# Patient Record
Sex: FEMALE | Race: BLACK OR AFRICAN AMERICAN | NOT HISPANIC OR LATINO | URBAN - METROPOLITAN AREA
[De-identification: names, ages, dates, MRNs, and addresses within clinical notes are randomized per-mention and may not be internally consistent; named-entity substitution may affect disease eponyms.]

---

## 2017-08-31 ENCOUNTER — EMERGENCY (EMERGENCY)
Facility: HOSPITAL | Age: 62
LOS: 1 days | Discharge: HOME | End: 2017-08-31
Attending: EMERGENCY MEDICINE | Admitting: EMERGENCY MEDICINE
Payer: COMMERCIAL

## 2017-08-31 VITALS
TEMPERATURE: 98 F | HEART RATE: 66 BPM | DIASTOLIC BLOOD PRESSURE: 70 MMHG | RESPIRATION RATE: 16 BRPM | SYSTOLIC BLOOD PRESSURE: 136 MMHG | OXYGEN SATURATION: 100 %

## 2017-08-31 VITALS
RESPIRATION RATE: 16 BRPM | OXYGEN SATURATION: 97 % | TEMPERATURE: 98 F | DIASTOLIC BLOOD PRESSURE: 86 MMHG | SYSTOLIC BLOOD PRESSURE: 148 MMHG | HEART RATE: 74 BPM

## 2017-08-31 LAB
ALBUMIN SERPL ELPH-MCNC: 4.1 G/DL — SIGNIFICANT CHANGE UP (ref 3.3–5)
ALP SERPL-CCNC: 77 U/L — SIGNIFICANT CHANGE UP (ref 40–120)
ALT FLD-CCNC: 16 U/L — SIGNIFICANT CHANGE UP (ref 4–33)
AST SERPL-CCNC: 17 U/L — SIGNIFICANT CHANGE UP (ref 4–32)
BASOPHILS # BLD AUTO: 0.01 K/UL — SIGNIFICANT CHANGE UP (ref 0–0.2)
BASOPHILS NFR BLD AUTO: 0.2 % — SIGNIFICANT CHANGE UP (ref 0–2)
BILIRUB SERPL-MCNC: 0.5 MG/DL — SIGNIFICANT CHANGE UP (ref 0.2–1.2)
BUN SERPL-MCNC: 24 MG/DL — HIGH (ref 7–23)
CALCIUM SERPL-MCNC: 9.2 MG/DL — SIGNIFICANT CHANGE UP (ref 8.4–10.5)
CHLORIDE SERPL-SCNC: 101 MMOL/L — SIGNIFICANT CHANGE UP (ref 98–107)
CO2 SERPL-SCNC: 24 MMOL/L — SIGNIFICANT CHANGE UP (ref 22–31)
CREAT SERPL-MCNC: 1.1 MG/DL — SIGNIFICANT CHANGE UP (ref 0.5–1.3)
CRP SERPL-MCNC: 7.9 MG/L — SIGNIFICANT CHANGE UP
EOSINOPHIL # BLD AUTO: 0.1 K/UL — SIGNIFICANT CHANGE UP (ref 0–0.5)
EOSINOPHIL NFR BLD AUTO: 2 % — SIGNIFICANT CHANGE UP (ref 0–6)
ERYTHROCYTE [SEDIMENTATION RATE] IN BLOOD: 14 MM/HR — SIGNIFICANT CHANGE UP (ref 4–25)
GLUCOSE SERPL-MCNC: 100 MG/DL — HIGH (ref 70–99)
HCT VFR BLD CALC: 40.5 % — SIGNIFICANT CHANGE UP (ref 34.5–45)
HGB BLD-MCNC: 13.6 G/DL — SIGNIFICANT CHANGE UP (ref 11.5–15.5)
IMM GRANULOCYTES # BLD AUTO: 0.01 # — SIGNIFICANT CHANGE UP
IMM GRANULOCYTES NFR BLD AUTO: 0.2 % — SIGNIFICANT CHANGE UP (ref 0–1.5)
LYMPHOCYTES # BLD AUTO: 1.21 K/UL — SIGNIFICANT CHANGE UP (ref 1–3.3)
LYMPHOCYTES # BLD AUTO: 24 % — SIGNIFICANT CHANGE UP (ref 13–44)
MCHC RBC-ENTMCNC: 27.3 PG — SIGNIFICANT CHANGE UP (ref 27–34)
MCHC RBC-ENTMCNC: 33.6 % — SIGNIFICANT CHANGE UP (ref 32–36)
MCV RBC AUTO: 81.3 FL — SIGNIFICANT CHANGE UP (ref 80–100)
MONOCYTES # BLD AUTO: 0.35 K/UL — SIGNIFICANT CHANGE UP (ref 0–0.9)
MONOCYTES NFR BLD AUTO: 6.9 % — SIGNIFICANT CHANGE UP (ref 2–14)
NEUTROPHILS # BLD AUTO: 3.36 K/UL — SIGNIFICANT CHANGE UP (ref 1.8–7.4)
NEUTROPHILS NFR BLD AUTO: 66.7 % — SIGNIFICANT CHANGE UP (ref 43–77)
NRBC # FLD: 0 — SIGNIFICANT CHANGE UP
PLATELET # BLD AUTO: 182 K/UL — SIGNIFICANT CHANGE UP (ref 150–400)
PMV BLD: 11.2 FL — SIGNIFICANT CHANGE UP (ref 7–13)
POTASSIUM SERPL-MCNC: 4.3 MMOL/L — SIGNIFICANT CHANGE UP (ref 3.5–5.3)
POTASSIUM SERPL-SCNC: 4.3 MMOL/L — SIGNIFICANT CHANGE UP (ref 3.5–5.3)
PROT SERPL-MCNC: 7.3 G/DL — SIGNIFICANT CHANGE UP (ref 6–8.3)
RBC # BLD: 4.98 M/UL — SIGNIFICANT CHANGE UP (ref 3.8–5.2)
RBC # FLD: 14.4 % — SIGNIFICANT CHANGE UP (ref 10.3–14.5)
SODIUM SERPL-SCNC: 138 MMOL/L — SIGNIFICANT CHANGE UP (ref 135–145)
WBC # BLD: 5.04 K/UL — SIGNIFICANT CHANGE UP (ref 3.8–10.5)
WBC # FLD AUTO: 5.04 K/UL — SIGNIFICANT CHANGE UP (ref 3.8–10.5)

## 2017-08-31 PROCEDURE — 99284 EMERGENCY DEPT VISIT MOD MDM: CPT

## 2017-08-31 PROCEDURE — 73630 X-RAY EXAM OF FOOT: CPT | Mod: 26,RT

## 2017-08-31 RX ADMIN — Medication 1 TABLET(S): at 09:20

## 2017-08-31 NOTE — ED ADULT NURSE NOTE - OBJECTIVE STATEMENT
pt rec'd in intake, s/p being scratched by her employer's cat, pt noted with 2 scratches to right foot with peripheral swelling, c/o pain.

## 2017-08-31 NOTE — ED PROVIDER NOTE - PLAN OF CARE
Follow up with your Doctor in 1-2 days.  Take Augmentin one tablet orally 2 x a day x 7 days.  Rest.  Elevate foot.  Return to the ER for any persistent/worsening or new symptoms fevers, chills, redness, swelling or any concerning symptoms.

## 2017-08-31 NOTE — ED PROVIDER NOTE - OBJECTIVE STATEMENT
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 Pt now notes swelling, redness and pain. Pt called employer and I spoke to employer. Cat is vaccinated, has scratched employer's father and he has been okay. Cat has been acting normally. Denies fever, chills, difficulty walking or any other complaints.

## 2017-08-31 NOTE — ED ADULT NURSE REASSESSMENT NOTE - NS ED NURSE REASSESS COMMENT FT1
assumed care of pt from Intake pt ambulating without difficulty warm packs to right foot awaiting labs Plan of care discussed with pt vital signs stable

## 2017-08-31 NOTE — ED PROVIDER NOTE - SKIN WOUND DESCRIPTION
right foot dorsal swelling, 3x3cm area w/ 2 scratches noted and redness, no fluctuance, no streaking, FROM

## 2017-08-31 NOTE — ED PROVIDER NOTE - MEDICAL DECISION MAKING DETAILS
62 y/o F c/o right foot pain s/p scratched by cat. Will obtain XR, basic labs, give Augmentin for coverage from cat scratch and apply hot compresses

## 2017-08-31 NOTE — ED PROVIDER NOTE - CARE PLAN
Principal Discharge DX:	Cat bite of foot, right, initial encounter Principal Discharge DX:	Cat bite of foot, right, initial encounter  Instructions for follow-up, activity and diet:	Follow up with your Doctor in 1-2 days.  Take Augmentin one tablet orally 2 x a day x 7 days.  Rest.  Elevate foot.  Return to the ER for any persistent/worsening or new symptoms fevers, chills, redness, swelling or any concerning symptoms.

## 2017-08-31 NOTE — ED PROVIDER NOTE - PROGRESS NOTE DETAILS
RAQUEL Young:(QUID PA) pt feels better; pt ambulating without assistance.  Results reviewed with patient.  Discharge reviewed and discussed with patient.  Per patient Cat's vaccines are UTD.

## 2019-03-31 ENCOUNTER — EMERGENCY (EMERGENCY)
Facility: HOSPITAL | Age: 64
LOS: 1 days | Discharge: HOME | End: 2019-03-31
Attending: EMERGENCY MEDICINE | Admitting: EMERGENCY MEDICINE
Payer: COMMERCIAL

## 2019-03-31 VITALS
TEMPERATURE: 98 F | OXYGEN SATURATION: 100 % | RESPIRATION RATE: 16 BRPM | DIASTOLIC BLOOD PRESSURE: 96 MMHG | SYSTOLIC BLOOD PRESSURE: 172 MMHG | HEART RATE: 72 BPM

## 2019-03-31 PROCEDURE — 99283 EMERGENCY DEPT VISIT LOW MDM: CPT | Mod: 25

## 2019-03-31 PROCEDURE — 93010 ELECTROCARDIOGRAM REPORT: CPT

## 2019-03-31 NOTE — ED ADULT TRIAGE NOTE - CHIEF COMPLAINT QUOTE
Patient p/w a c/c of intermittent atraumatic pain in her left arm x3 weeks, described as squeezing in nature.  Denies HA, blurry vision, diplopia, dizziness, SOB, CP.  Negative facial droop, pronator drift, slurred speech.  Denies PMH.

## 2019-04-01 LAB
ALBUMIN SERPL ELPH-MCNC: 3.9 G/DL — SIGNIFICANT CHANGE UP (ref 3.3–5)
ALP SERPL-CCNC: 87 U/L — SIGNIFICANT CHANGE UP (ref 40–120)
ALT FLD-CCNC: 21 U/L — SIGNIFICANT CHANGE UP (ref 4–33)
ANION GAP SERPL CALC-SCNC: 12 MMO/L — SIGNIFICANT CHANGE UP (ref 7–14)
AST SERPL-CCNC: 21 U/L — SIGNIFICANT CHANGE UP (ref 4–32)
BASOPHILS # BLD AUTO: 0.01 K/UL — SIGNIFICANT CHANGE UP (ref 0–0.2)
BASOPHILS NFR BLD AUTO: 0.4 % — SIGNIFICANT CHANGE UP (ref 0–2)
BILIRUB SERPL-MCNC: < 0.2 MG/DL — LOW (ref 0.2–1.2)
BUN SERPL-MCNC: 29 MG/DL — HIGH (ref 7–23)
CALCIUM SERPL-MCNC: 8.9 MG/DL — SIGNIFICANT CHANGE UP (ref 8.4–10.5)
CHLORIDE SERPL-SCNC: 106 MMOL/L — SIGNIFICANT CHANGE UP (ref 98–107)
CO2 SERPL-SCNC: 22 MMOL/L — SIGNIFICANT CHANGE UP (ref 22–31)
CREAT SERPL-MCNC: 1.27 MG/DL — SIGNIFICANT CHANGE UP (ref 0.5–1.3)
EOSINOPHIL # BLD AUTO: 0.11 K/UL — SIGNIFICANT CHANGE UP (ref 0–0.5)
EOSINOPHIL NFR BLD AUTO: 4.1 % — SIGNIFICANT CHANGE UP (ref 0–6)
GLUCOSE SERPL-MCNC: 116 MG/DL — HIGH (ref 70–99)
HCT VFR BLD CALC: 39.3 % — SIGNIFICANT CHANGE UP (ref 34.5–45)
HGB BLD-MCNC: 12.4 G/DL — SIGNIFICANT CHANGE UP (ref 11.5–15.5)
IMM GRANULOCYTES NFR BLD AUTO: 0 % — SIGNIFICANT CHANGE UP (ref 0–1.5)
LYMPHOCYTES # BLD AUTO: 0.97 K/UL — LOW (ref 1–3.3)
LYMPHOCYTES # BLD AUTO: 36.5 % — SIGNIFICANT CHANGE UP (ref 13–44)
MAGNESIUM SERPL-MCNC: 1.9 MG/DL — SIGNIFICANT CHANGE UP (ref 1.6–2.6)
MCHC RBC-ENTMCNC: 26.5 PG — LOW (ref 27–34)
MCHC RBC-ENTMCNC: 31.6 % — LOW (ref 32–36)
MCV RBC AUTO: 84 FL — SIGNIFICANT CHANGE UP (ref 80–100)
MONOCYTES # BLD AUTO: 0.29 K/UL — SIGNIFICANT CHANGE UP (ref 0–0.9)
MONOCYTES NFR BLD AUTO: 10.9 % — SIGNIFICANT CHANGE UP (ref 2–14)
NEUTROPHILS # BLD AUTO: 1.28 K/UL — LOW (ref 1.8–7.4)
NEUTROPHILS NFR BLD AUTO: 48.1 % — SIGNIFICANT CHANGE UP (ref 43–77)
NRBC # FLD: 0 K/UL — SIGNIFICANT CHANGE UP (ref 0–0)
PHOSPHATE SERPL-MCNC: 3.6 MG/DL — SIGNIFICANT CHANGE UP (ref 2.5–4.5)
PLATELET # BLD AUTO: 195 K/UL — SIGNIFICANT CHANGE UP (ref 150–400)
PMV BLD: 10.8 FL — SIGNIFICANT CHANGE UP (ref 7–13)
POTASSIUM SERPL-MCNC: 3.9 MMOL/L — SIGNIFICANT CHANGE UP (ref 3.5–5.3)
POTASSIUM SERPL-SCNC: 3.9 MMOL/L — SIGNIFICANT CHANGE UP (ref 3.5–5.3)
PROT SERPL-MCNC: 6.5 G/DL — SIGNIFICANT CHANGE UP (ref 6–8.3)
RBC # BLD: 4.68 M/UL — SIGNIFICANT CHANGE UP (ref 3.8–5.2)
RBC # FLD: 14.6 % — HIGH (ref 10.3–14.5)
SODIUM SERPL-SCNC: 140 MMOL/L — SIGNIFICANT CHANGE UP (ref 135–145)
WBC # BLD: 2.66 K/UL — LOW (ref 3.8–10.5)
WBC # FLD AUTO: 2.66 K/UL — LOW (ref 3.8–10.5)

## 2019-04-01 NOTE — ED PROVIDER NOTE - OBJECTIVE STATEMENT
64 y/o F with no significant PMHx presents to ED with L upper arm muscle spasm since 2 weeks. Pt states that spasms are unprovoked and that they have been causing significant pain. Pt notes that the episodes last minutes at a time. Pt denies any chest pain , SOB , fever , chills , or diarrhea. 64 y/o F with no significant PMHx presents to ED with L upper arm muscle spasm since 2 weeks. Pt states that spasms are unprovoked and that they have been causing significant pain. Pt notes that the episodes last minutes at a time. Pt denies any chest pain , SOB , fever , chills , or diarrhea. No falls or injuries.  No HA.

## 2019-04-01 NOTE — ED PROVIDER NOTE - CLINICAL SUMMARY MEDICAL DECISION MAKING FREE TEXT BOX
64 y/o healthy F presents to ED with frequent muscle spasm. Will assess electrolytes, patient due to see PMD tomorrow morning.

## 2019-04-01 NOTE — ED ADULT NURSE NOTE - OBJECTIVE STATEMENT
pt presents to the ed today for left arm pain that is intermittent feels as if it is pitching. Arm is no evident trauma. Full ROM. As per MD butterfly for labs. Awaiting lab results

## 2019-04-01 NOTE — ED PROVIDER NOTE - NSFOLLOWUPINSTRUCTIONS_ED_ALL_ED_FT
A cause for yuour symptoms was not found. Please see your doctor as scheduled. Return to the ER for worsening pain, chest pain, fevers, or any other concerning signs.

## 2019-05-13 NOTE — ED PROVIDER NOTE - ATTESTATION, MLM
Labs have not been uploaded in system, Heme/onc wondering why pt was preferred.  Lab results can be faxed to [phone]. Will need to fax tomorrow as results are at other office.   I have reviewed and confirmed nurses' notes for patient's medications, allergies, medical history, and surgical history.

## 2020-09-11 NOTE — ED PROVIDER NOTE - CONDUCTED A DETAILED DISCUSSION WITH PATIENT AND/OR GUARDIAN REGARDING, MDM
[Negative] : Heme/Lymph [Patient Intake Form Reviewed] : Patient intake form was reviewed lab results/radiology results

## 2023-06-29 ENCOUNTER — EMERGENCY (EMERGENCY)
Facility: HOSPITAL | Age: 68
LOS: 1 days | Discharge: HOME | End: 2023-06-29
Attending: STUDENT IN AN ORGANIZED HEALTH CARE EDUCATION/TRAINING PROGRAM | Admitting: STUDENT IN AN ORGANIZED HEALTH CARE EDUCATION/TRAINING PROGRAM
Payer: COMMERCIAL

## 2023-06-29 VITALS
HEART RATE: 70 BPM | DIASTOLIC BLOOD PRESSURE: 79 MMHG | OXYGEN SATURATION: 100 % | SYSTOLIC BLOOD PRESSURE: 151 MMHG | RESPIRATION RATE: 15 BRPM | TEMPERATURE: 98 F

## 2023-06-29 DIAGNOSIS — T16.1XXA FOREIGN BODY IN RIGHT EAR, INITIAL ENCOUNTER: ICD-10-CM

## 2023-06-29 DIAGNOSIS — H60.501 UNSPECIFIED ACUTE NONINFECTIVE OTITIS EXTERNA, RIGHT EAR: ICD-10-CM

## 2023-06-29 PROBLEM — Z78.9 OTHER SPECIFIED HEALTH STATUS: Chronic | Status: ACTIVE | Noted: 2019-04-11

## 2023-06-29 PROCEDURE — 99284 EMERGENCY DEPT VISIT MOD MDM: CPT

## 2023-06-29 RX ORDER — CIPROFLOXACIN/HYDROCORTISONE 0.2 %-1 %
2 SUSPENSION, DROPS(FINAL DOSAGE FORM)(ML) OTIC (EAR)
Refills: 0 | Status: DISCONTINUED | OUTPATIENT
Start: 2023-06-29 | End: 2023-07-02

## 2023-06-29 RX ORDER — CIPROFLOXACIN AND DEXAMETHASONE 3; 1 MG/ML; MG/ML
4 SUSPENSION/ DROPS AURICULAR (OTIC)
Qty: 1 | Refills: 0
Start: 2023-06-29 | End: 2023-07-05

## 2023-06-29 RX ORDER — CIPROFLOXACIN AND FLUOCINOLONE ACETONIDE .75; .0625 MG/.25ML; MG/.25ML
0.25 SOLUTION AURICULAR (OTIC) ONCE
Refills: 0 | Status: DISCONTINUED | OUTPATIENT
Start: 2023-06-29 | End: 2023-06-29

## 2023-06-29 RX ADMIN — Medication 2 DROP(S): at 12:12

## 2023-06-29 NOTE — ED PROVIDER NOTE - PHYSICAL EXAMINATION
+R ear - impacted foreign body (Q tip) with bright red blood visualized in ear canal; partial tympanic membrane that was visualized is intact however cannot view TM posterior to FBO

## 2023-06-29 NOTE — ED ADULT NURSE NOTE - HISTORY OF COVID-19 VACCINATION
Addended by: KADEEM MCBRIDE on: 11/30/2018 03:10 PM     Modules accepted: Orders    
Vaccine status unknown

## 2023-06-29 NOTE — ED PROVIDER NOTE - PATIENT PORTAL LINK FT
You can access the FollowMyHealth Patient Portal offered by [facility] by registering at the following website: [email or web address]. By joining [employer]’s FollowMyHealth portal, you will also be able to view your health information using other applications (apps) compatible with our system.

## 2023-06-29 NOTE — CONSULT NOTE ADULT - SUBJECTIVE AND OBJECTIVE BOX
CC: "something inside my right ear"    HPI: 66 y/o F without significant PMHx presents to [facility] ED for right ear pain and foreign body sensation in the ear. Patient reports she was using q-tip to clean her right ear, and part of it broke off. Since then she has been having pain and feeling something is stuck inside her ears and mild decreased of hearing on the right side. Denies otorrhea, tinnitus, vertigo, headache, fever, chills, and nasal discharges.    PAST MEDICAL & SURGICAL HISTORY:  No pertinent past medical history      No significant past surgical history        Allergies    No Known Allergies    Intolerances      MEDICATIONS  (STANDING):  ciprofloxacin/hydrocortisone Suspension Otic 2 Drop(s) Right Ear two times a day    MEDICATIONS  (PRN):      Social History: never smoker    Family history: No pertinent family history in first degree relatives    ROS:   ENT: all negative except as noted in HPI   CV: denies palpitations  Pulm: denies SOB, cough, hemoptysis  GI: denies change in appetite, indigestion, n/v  : denies pertinent urinary symptoms, urgency  Neuro: denies numbness/tingling, loss of sensation  Psych: denies anxiety  MS: denies muscle weakness, instability  Heme: denies easy bruising or bleeding  Endo: denies heat/cold intolerance, excessive sweating  Vascular: denies LE edema    Vital Signs Last 24 Hrs  T(C): 36.6 (29 Jun 2023 07:49), Max: 36.6 (29 Jun 2023 07:49)  T(F): 97.9 (29 Jun 2023 07:49), Max: 97.9 (29 Jun 2023 07:49)  HR: 70 (29 Jun 2023 07:49) (70 - 70)  BP: 151/79 (29 Jun 2023 07:49) (151/79 - 151/79)  BP(mean): --  RR: 15 (29 Jun 2023 07:49) (15 - 15)  SpO2: 100% (29 Jun 2023 07:49) (100% - 100%)    Parameters below as of 29 Jun 2023 07:49  Patient On (Oxygen Delivery Method): room air       PHYSICAL EXAM:  Gen: NAD  Skin: No rashes, bruises, or lesions  Head: Normocephalic, Atraumatic  Face: no edema, erythema, or fluctuance. Parotid glands soft without mass  Eyes: no scleral injection  Ears: Right - ear canal with mild ecchymosis in the inferior EAC, whitish tissue mixed with cerumen noted cover against TM, removed via suction. TM visulized, intact without effusion or erythema. No evidence of any fluid drainage. No mastoid tenderness, erythema, or ear bulging            Left - ear canal clear, TM intact without effusion or erythema. No evidence of any fluid drainage. No mastoid tenderness, erythema, or ear bulging  Nose: Nares bilaterally patent, no discharge  Mouth: No stridor, no drooling, no trismus.  Mucosa moist, tongue/uvula midline, oropharynx clear  Neck: Flat, supple, no lymphadenopathy, trachea midline, no masses  Lymphatic: No lymphadenopathy  Resp: breathing easily, no stridor  CV: no peripheral edema/cyanosis  GI: nondistended   Peripheral vascular: no JVD or edema  Neuro: facial nerve intact, no facial droop

## 2023-06-29 NOTE — ED PROVIDER NOTE - NSFOLLOWUPINSTRUCTIONS_ED_ALL_ED_FT
You were seen for ear pain. The object in the right ear was removed. Please put two drops in the affected ear twice a day for seven days. Return to the ER for increased pain, draining, hearing loss. Follow up with your primary care doctor in 2 days.

## 2023-06-29 NOTE — ED PROVIDER NOTE - OBJECTIVE STATEMENT
68 yo F no pmh presents with R ear pain after inserting a Q tip that broke off in the ear. Has had some bleeding from the ear. Endorses pain. Patient went to urgent care yesterday, was given ear drops. No fevers, chills.

## 2023-06-29 NOTE — ED ADULT TRIAGE NOTE - CHIEF COMPLAINT QUOTE
presents C/o right ear pain x1 day after using Q-tip. No complaints of chest pain, headache, nausea, dizziness, vomiting  SOB, fever, chills verbalized. no phx.

## 2023-06-29 NOTE — ED PROVIDER NOTE - ATTENDING CONTRIBUTION TO CARE
I have personally performed a face to face medical and diagnostic evaluation of the patient. I have discussed with and reviewed the Resident's note and agree with the History, ROS, Physical Exam and MDM unless otherwise indicated. A brief summary of my personal evaluation and impression can be found below.    67y F w/ no PMHx p/w c/f foreign body in R ear and ear pain after sticking q-tip in ear to itch it yesterday morning at 6am. Patient states when she was scratching her ear with q-tip, the middle part broke off and the tip remained in her ear, then took the broken piece of the q-tip (wood) and stuck it in the ear to dislodge tip but then saw blood and felt pain. States she waited until today to come in because she thought it would dislodge on her own. States she has a feeling of "fullness" in her ear and intermittent pain. Went to  this morning and was prescribed polytrim drops and told to come to the ED.     Physical Exam:  Gen: NAD, AOx3, non-toxic appearing, able to ambulate without assistance  Head: NCAT  HEENT: no e/o foreign body in R ear, +large tympanic perforation w/ e/o blood in auditory canal, normal conjunctiva, tongue midline, oral mucosa moist  Lung: CTAB, no respiratory distress, no wheezes/rhonchi/rales B/L, speaking in full sentences  CV: RRR, no murmurs, rubs or gallops  Neuro: No focal sensory or motor deficits  Skin: no rash     ENT consult, no visible foreign body to remove contrary to resident note. Patient defers pain medication at this time. Will reassess. I have personally performed a face to face medical and diagnostic evaluation of the patient. I have discussed with and reviewed the Resident's note and agree with the History, ROS, Physical Exam and MDM unless otherwise indicated. A brief summary of my personal evaluation and impression can be found below.    67y F w/ no PMHx p/w c/f foreign body in R ear and ear pain after sticking q-tip in ear to itch it yesterday morning at 6am. Patient states when she was scratching her ear with q-tip, the middle part broke off and the tip remained in her ear, then took the broken piece of the q-tip (wood) and stuck it in the ear to dislodge tip but then saw blood and felt pain. States she waited until today to come in because she thought it would dislodge on her own. States she has a feeling of "fullness" in her ear and intermittent pain. Went to  this morning and was prescribed polytrim drops and told to come to the ED.     Physical Exam:  Gen: NAD, AOx3, non-toxic appearing, able to ambulate without assistance  Head: NCAT  HEENT: questionable TM perforation vs. just foreign body, +blood in auditory canal, normal conjunctiva, tongue midline, oral mucosa moist  Lung: CTAB, no respiratory distress, no wheezes/rhonchi/rales B/L, speaking in full sentences  CV: RRR, no murmurs, rubs or gallops  Neuro: No focal sensory or motor deficits  Skin: no rash     ENT consult, as difficult to assess for large TM perforation, patient defers pain medication at this time. Will reassess.

## 2023-06-29 NOTE — CONSULT NOTE ADULT - ASSESSMENT
66 y/o F without significant PMHx presents to [facility] ED for right ear pain and foreign body sensation in the ear. Patient reports she was using q-tip to clean her right ear, and part of it broke off. Since then she has been having pain and feeling something is stuck inside her ears and mild decreased of hearing on the right side. Ear exam shows Right ear canal with mild ecchymosis in the inferior EAC likely from q-tip trauma, whitish tissue mixed with cerumen noted cover against TM, removed via suction. TM visulized, intact without effusion or erythema. No evidence of any fluid drainage. No mastoid tenderness, erythema, or ear bulging. Patient reports immediately improvement of pain, discomfort and hearing after removal of the tissue inside the ear.

## 2023-06-29 NOTE — CONSULT NOTE ADULT - PROBLEM SELECTOR RECOMMENDATION 2
- Please discharge patient on ciprodex otic drops 4 drops BID on the right ear for a total of 7 days.   - Advise patient do not put anything inside the ears, and do not clean the ear with q-tip  - Patient can follow up outpatient for surveillance audiogram  - Please call [phone] for an appointment outpatient as needed  - Case discussed with Dr. Macias

## 2023-06-29 NOTE — ED PROVIDER NOTE - PROGRESS NOTE DETAILS
ENT consulted for possible foreign body in R ear vs TM rupture - ENT retrieved object, TM is now visible and does not appear perforated. Recommend abx for ear to prevent otitis externa.     Isela Clayton MD, PGY2

## 2023-06-29 NOTE — CONSULT NOTE ADULT - PROBLEM SELECTOR RECOMMENDATION 9
- S/p removal of tissue from the right ear canal with immediate improvement  - Advise patient do not put anything inside the ears, and do not clean the ear with q-tip  - Patient can follow up outpatient for surveillance audiogram  - Please call [phone] for an appointment outpatient as needed  - Case discussed with Dr. Macias

## 2023-06-29 NOTE — ED PROVIDER NOTE - CLINICAL SUMMARY MEDICAL DECISION MAKING FREE TEXT BOX
66 yo with foreign body, bleeding, pain in R ear. VSS. FBO visualized in R ear. Will attempt retrieval.